# Patient Record
Sex: FEMALE | Race: BLACK OR AFRICAN AMERICAN | Employment: FULL TIME | URBAN - METROPOLITAN AREA
[De-identification: names, ages, dates, MRNs, and addresses within clinical notes are randomized per-mention and may not be internally consistent; named-entity substitution may affect disease eponyms.]

---

## 2019-09-22 ENCOUNTER — HOSPITAL ENCOUNTER (EMERGENCY)
Age: 34
Discharge: HOME | End: 2019-09-22
Attending: EMERGENCY MEDICINE
Payer: COMMERCIAL

## 2019-09-22 VITALS
OXYGEN SATURATION: 98 % | DIASTOLIC BLOOD PRESSURE: 60 MMHG | SYSTOLIC BLOOD PRESSURE: 112 MMHG | HEART RATE: 92 BPM | TEMPERATURE: 98.6 F | RESPIRATION RATE: 18 BRPM
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
## 2019-09-22 DIAGNOSIS — S16.1XXA NECK STRAIN, INITIAL ENCOUNTER: Primary | ICD-10-CM

## 2019-09-22 PROCEDURE — 74011250637 HC RX REV CODE- 250/637: Performed by: EMERGENCY MEDICINE

## 2019-09-22 PROCEDURE — 99283 EMERGENCY DEPT VISIT LOW MDM: CPT

## 2019-09-22 RX ORDER — NAPROXEN 250 MG/1
500 TABLET ORAL
Status: COMPLETED | OUTPATIENT
Start: 2019-09-22 | End: 2019-09-22

## 2019-09-22 RX ORDER — METHOCARBAMOL 750 MG/1
750 TABLET, FILM COATED ORAL
Qty: 20 TAB | Refills: 0 | Status: SHIPPED | OUTPATIENT
Start: 2019-09-22

## 2019-09-22 RX ORDER — NAPROXEN 500 MG/1
500 TABLET ORAL 2 TIMES DAILY WITH MEALS
Qty: 20 TAB | Refills: 0 | Status: SHIPPED | OUTPATIENT
Start: 2019-09-22 | End: 2019-10-02

## 2019-09-22 RX ADMIN — NAPROXEN 500 MG: 250 TABLET ORAL at 11:43
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
## 2019-09-22 NOTE — LETTER
[address] 
[facility] EMERGENCY DEPT 
[address] [facility] [place] [address] [phone] Work/School Note Date: 9/22/2019 To Whom It May concern: 
 
Sandra Scott was seen and treated today in the emergency room by the following provider(s): 
No providers found. Sandra Scott may return to work on 9/24. Sincerely, José Miguel Gupta, DO

## 2019-09-25 ENCOUNTER — OFFICE VISIT (OUTPATIENT)
Dept: FAMILY MEDICINE CLINIC | Age: 34
End: 2019-09-25

## 2019-09-25 VITALS
DIASTOLIC BLOOD PRESSURE: 67 MMHG | BODY MASS INDEX: 22.28 KG/M2 | HEIGHT: 61 IN | SYSTOLIC BLOOD PRESSURE: 99 MMHG | TEMPERATURE: 98.3 F | WEIGHT: 118 LBS | HEART RATE: 76 BPM | RESPIRATION RATE: 16 BRPM | OXYGEN SATURATION: 99 %
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
## 2019-09-25 DIAGNOSIS — S16.1XXD NECK STRAIN, SUBSEQUENT ENCOUNTER: ICD-10-CM
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
## 2019-09-25 NOTE — PATIENT INSTRUCTIONS
Continue to take naproxen and robaxin as needed for neck pain. Start applying heat to your neck and start neck stretches as well. Call office of physical therapist to schedule an appointment.
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
## 2019-09-27 NOTE — PROGRESS NOTES
[address] Medicine Residency Attending Addendum:  Patient encounter was discussed on the day of the encounter with Rodolfo Marion MD, performing the key elements of the service. I discussed the findings, assessment and plan with the resident and agree with the resident's findings and plan as documented in the resident's note.       Nickey Duverney, MD, CAQSM, RMSK

## 2019-10-25 ENCOUNTER — HOSPITAL ENCOUNTER (OUTPATIENT)
Dept: PHYSICAL THERAPY | Age: 34
Discharge: HOME | End: 2019-10-25
Attending: STUDENT IN AN ORGANIZED HEALTH CARE EDUCATION/TRAINING PROGRAM
Payer: COMMERCIAL

## 2019-10-25 PROCEDURE — 97161 PT EVAL LOW COMPLEX 20 MIN: CPT | Performed by: PHYSICAL THERAPIST

## 2019-10-25 PROCEDURE — 97110 THERAPEUTIC EXERCISES: CPT | Performed by: PHYSICAL THERAPIST
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
## 2019-11-01 ENCOUNTER — APPOINTMENT (OUTPATIENT)
Dept: PHYSICAL THERAPY | Age: 34
End: 2019-11-01
Attending: STUDENT IN AN ORGANIZED HEALTH CARE EDUCATION/TRAINING PROGRAM
Payer: COMMERCIAL

## 2019-11-08 ENCOUNTER — HOSPITAL ENCOUNTER (OUTPATIENT)
Dept: PHYSICAL THERAPY | Age: 34
Discharge: HOME | End: 2019-11-08
Attending: STUDENT IN AN ORGANIZED HEALTH CARE EDUCATION/TRAINING PROGRAM
Payer: COMMERCIAL

## 2019-11-08 PROCEDURE — 97110 THERAPEUTIC EXERCISES: CPT | Performed by: PHYSICAL THERAPIST

## 2019-11-08 NOTE — PROGRESS NOTES
PT DAILY TREATMENT NOTE - [facility] 2-15    Patient Name: Faisal Knight  Date:2019  : 1985  [x]  Patient  Verified  Payor: BLUE CROSS / Plan: ip.access [facility] / Product Type: PPO /    In time: 810 A  Out time: 900 A  Total Treatment Time (min): 50  Total Timed Codes (min): 40  1:1 Treatment Time ( only): 30   Visit #:  2    Treatment Area: Neck pain [M54.2]    SUBJECTIVE  Pain Level (0-10 scale): 6  Any medication changes, allergies to medications, adverse drug reactions, diagnosis change, or new procedure performed?: [x] No    [] Yes (see summary sheet for update)  Subjective functional status/changes:   [] No changes reported  She reports overall improvement in symptoms- \"today is the first day I've had a headache since I saw you last\". She reports \"some\" compliance with HEP.  She has not needed to use ice at home    OBJECTIVE    Modality rationale: decrease inflammation and decrease pain to improve the patients ability to perform daily chores with dec' dsymptoms   Min Type Additional Details       [] Estim: []Att   []Unatt    []TENS instruct                  []IFC  []Premod   []NMES                     []Other:  []w/US   []w/ice   []w/heat  Position:  Location:       []  Traction: [] Cervical       []Lumbar                       [] Prone          []Supine                       []Intermittent   []Continuous Lbs:  [] before manual  [] after manual  []w/heat    []  Ultrasound: []Continuous   [] Pulsed                       at: []1MHz   []3MHz Location:  W/cm2:    [] Paraffin         Location:   []w/heat   10 [x]  Ice     []  Heat  []  Ice massage Position: seated  Location: R shoulder, cervical    []  Laser  []  Other: Position:  Location:      []  Vasopneumatic Device Pressure:       [] lo [] med [] hi   Temperature:      [x] Skin assessment post-treatment:  [x]intact []redness- no adverse reaction    []redness  adverse reaction:     40 min Therapeutic Exercise:  [x] See flow sheet : progressed per flow sheet   Rationale: increase ROM and increase strength to improve the patients ability to perform daily chores, activities with dec'd symptoms            With   [] TE   [] TA   [] neuro   [] other: Patient Education: [x] Review HEP    [] Progressed/Changed HEP based on:   [] positioning   [] body mechanics   [] transfers   [] heat/ice application    [] other:      Other Objective/Functional Measures: n/a     Pain Level (0-10 scale) post treatment:  0    ASSESSMENT/Changes in Function:   Overall coreen therapy session well. Encouraged compliance with HEP in order to maximize benefit of PT    Patient will continue to benefit from skilled PT services to modify and progress therapeutic interventions, address functional mobility deficits, address ROM deficits, analyze and address soft tissue restrictions, analyze and cue movement patterns and analyze and modify body mechanics/ergonomics to attain remaining goals.      [x]  See Plan of Care  []  See progress note/recertification  []  See Discharge Summary         Progress towards goals / Updated goals:  nt    PLAN  []  Upgrade activities as tolerated     [x]  Continue plan of care  []  Update interventions per flow sheet       []  Discharge due to:_  []  Other:_      Viki Gama, PT 11/8/2019

## 2019-11-11 ENCOUNTER — HOSPITAL ENCOUNTER (OUTPATIENT)
Dept: LAB | Age: 34
Discharge: HOME | End: 2019-11-11

## 2019-11-15 ENCOUNTER — HOSPITAL ENCOUNTER (OUTPATIENT)
Dept: PHYSICAL THERAPY | Age: 34
Discharge: HOME | End: 2019-11-15
Attending: STUDENT IN AN ORGANIZED HEALTH CARE EDUCATION/TRAINING PROGRAM
Payer: COMMERCIAL

## 2019-11-15 PROCEDURE — 97110 THERAPEUTIC EXERCISES: CPT | Performed by: PHYSICAL THERAPIST

## 2019-11-15 NOTE — PROGRESS NOTES
PT DAILY TREATMENT NOTE - [facility] 2-15    Patient Name: Erma Mayes  Date:11/15/2019  : 1985  [x]  Patient  Verified  Payor: BLUE CROSS / Plan: [employer] [facility] / Product Type: PPO /    In time: 810 A  Out time: 855  A  Total Treatment Time (min): 45  Total Timed Codes (min): 45  1:1 Treatment Time ( only): 40   Visit #:  3    Treatment Area: Neck pain [M54.2]    SUBJECTIVE  Pain Level (0-10 scale): 2  Any medication changes, allergies to medications, adverse drug reactions, diagnosis change, or new procedure performed?: [x] No    [] Yes (see summary sheet for update)  Subjective functional status/changes:   [] No changes reported  She states that she had some soreness between her shoulder blades after last PT visit- symptoms lasted approx 24 hours. Reports good compliance with HEP.     OBJECTIVE    Modality rationale: decrease inflammation and decrease pain to improve the patients ability to perform daily chores with dec' dsymptoms   Min Type Additional Details       [] Estim: []Att   []Unatt    []TENS instruct                  []IFC  []Premod   []NMES                     []Other:  []w/US   []w/ice   []w/heat  Position:  Location:       []  Traction: [] Cervical       []Lumbar                       [] Prone          []Supine                       []Intermittent   []Continuous Lbs:  [] before manual  [] after manual  []w/heat    []  Ultrasound: []Continuous   [] Pulsed                       at: []1MHz   []3MHz Location:  W/cm2:    [] Paraffin         Location:   []w/heat   declined [x]  Ice     []  Heat  []  Ice massage Position: seated  Location: R shoulder, cervical    []  Laser  []  Other: Position:  Location:      []  Vasopneumatic Device Pressure:       [] lo [] med [] hi   Temperature:      [x] Skin assessment post-treatment:  [x]intact []redness- no adverse reaction    []redness  adverse reaction:     45 min Therapeutic Exercise:  [x] See flow sheet : progressed per flow sheet   Rationale: increase ROM and increase strength to improve the patients ability to perform daily chores, activities with dec'd symptoms            With   [] TE   [] TA   [] neuro   [] other: Patient Education: [x] Review HEP    [] Progressed/Changed HEP based on:   [] positioning   [] body mechanics   [] transfers   [] heat/ice application    [] other:      Other Objective/Functional Measures: n/a     Pain Level (0-10 scale) post treatment:  0    ASSESSMENT/Changes in Function:   Updated HEP handout. Beth therapy session well; dec'd symptoms after treatment session    Patient will continue to benefit from skilled PT services to modify and progress therapeutic interventions, address functional mobility deficits, address ROM deficits, analyze and address soft tissue restrictions, analyze and cue movement patterns and analyze and modify body mechanics/ergonomics to attain remaining goals.      [x]  See Plan of Care  []  See progress note/recertification  []  See Discharge Summary         Progress towards goals / Updated goals:  nt    PLAN  []  Upgrade activities as tolerated     [x]  Continue plan of care  []  Update interventions per flow sheet       []  Discharge due to:_  []  Other:_      Rayne Kilpatrick, PT 11/15/2019

## 2019-11-22 ENCOUNTER — HOSPITAL ENCOUNTER (OUTPATIENT)
Dept: PHYSICAL THERAPY | Age: 34
Discharge: HOME | End: 2019-11-22
Attending: STUDENT IN AN ORGANIZED HEALTH CARE EDUCATION/TRAINING PROGRAM
Payer: COMMERCIAL

## 2019-11-22 PROCEDURE — 97110 THERAPEUTIC EXERCISES: CPT | Performed by: PHYSICAL THERAPIST

## 2019-11-22 NOTE — ANCILLARY DISCHARGE INSTRUCTIONS
[facility] Physical Therapy [address] [place], [address] Phone: [phone]  Fax: [phone] Discharge Summary  2-15 Patient name: Lucy Agee  : 1985  Provider#:[id number] Referral source: Ekaterina Cerna MD     
Medical/Treatment Diagnosis: Neck pain [M54.2] Prior Hospitalization: see medical history Comorbidities: see eval 
Prior Level of Function:see eval 
Medications: Verified on Patient Summary List 
 
Start of Care: 10/25/19      Onset Date:see eval  
Visits from Start of Care: 4     Missed Visits: see CC Reporting Period : 10/25/19 to 19 Summary of care:  
Bilat AROM shoulder flex/abd WNL Gross UE strength 5/ 
  
FOTO: see chart          
  
Pain Level (0-10 scale) post treatment:  0 Progress towards goals / Updated goals: 
Short Term Goals: To be accomplished in 3-4 weeks: 
1) Pt will be independent in final HEP MET 2) Pt will report >/=50% improvement in symptoms MET 3) Pt will improve FOTO score to >/=74/100 in order to demonstrate improvement in functional mobility MET 
  
ASSESSMENT/Changes in Function:  
Pt has completed 4 skilled PT visits. She reports 80% improvement in symptoms and has met 3/3 PT goals. Bilat UE ROM and strength WNL. Good compliance with HEP. Pt ready for D/C to HEP. RECOMMENDATIONS: 
[x]Discontinue therapy: [x]Patient has reached or is progressing toward set goals []Patient is non-compliant or has abdicated 
    []Due to lack of appreciable progress towards set goals Onnie Morning, PT 2019 10:45 AM

## 2019-11-22 NOTE — PROGRESS NOTES
PT DAILY TREATMENT/Discharge NOTE - [facility] 2-15    Patient Name: Yudith Brown  Date:2019  : 1985  [x]  Patient  Verified  Payor: BLUE CROSS / Plan: [facility] / Product Type: PPO /    In time: 815 A  Out time: 900  A  Total Treatment Time (min): 45  Total Timed Codes (min): 45  1:1 Treatment Time ( only): 30   Visit #:  4    Treatment Area: Neck pain [M54.2]    SUBJECTIVE  Pain Level (0-10 scale): 0 \"some stiffness\"  Any medication changes, allergies to medications, adverse drug reactions, diagnosis change, or new procedure performed?: [x] No    [] Yes (see summary sheet for update)  Subjective functional status/changes:   [] No changes reported  Pt reports 90% improvement in symptoms.  Good compliance with HEP    OBJECTIVE    Modality rationale: decrease inflammation and decrease pain to improve the patients ability to perform daily chores with dec' dsymptoms   Min Type Additional Details       [] Estim: []Att   []Unatt    []TENS instruct                  []IFC  []Premod   []NMES                     []Other:  []w/US   []w/ice   []w/heat  Position:  Location:       []  Traction: [] Cervical       []Lumbar                       [] Prone          []Supine                       []Intermittent   []Continuous Lbs:  [] before manual  [] after manual  []w/heat    []  Ultrasound: []Continuous   [] Pulsed                       at: []1MHz   []3MHz Location:  W/cm2:    [] Paraffin         Location:   []w/heat   declined [x]  Ice     []  Heat  []  Ice massage Position: seated  Location: R shoulder, cervical    []  Laser  []  Other: Position:  Location:      []  Vasopneumatic Device Pressure:       [] lo [] med [] hi   Temperature:      [x] Skin assessment post-treatment:  [x]intact []redness- no adverse reaction    []redness  adverse reaction:     45 min Therapeutic Exercise:  [x] See flow sheet : reviewed HEP; measurements taken for D/C   Rationale: increase ROM and increase strength to improve the patients ability to perform daily chores, activities with dec'd symptoms            With   [] TE   [] TA   [] neuro   [] other: Patient Education: [x] Review HEP    [] Progressed/Changed HEP based on:   [] positioning   [] body mechanics   [] transfers   [] heat/ice application    [] other:      Other Objective/Functional Measures:   Bilat AROM shoulder flex/abd WNL  Gross UE strength 5/5    FOTO: see chart     Pain Level (0-10 scale) post treatment:  0    ASSESSMENT/Changes in Function:   Pt has completed 4 skilled PT visits. She reports 80% improvement in symptoms and has met 3/3 PT goals. Bilat UE ROM and strength WNL. Good compliance with HEP. Pt ready for D/C to HEP. []  See Plan of Care  []  See progress note/recertification  [x]  See Discharge Summary         Progress towards goals / Updated goals:  Short Term Goals: To be accomplished in 3-4 weeks:  1) Pt will be independent in final HEP MET  2) Pt will report >/=50% improvement in symptoms MET  3) Pt will improve FOTO score to >/=74/100 in order to demonstrate improvement in functional mobility MET    PLAN  D/C to HEP: pt met 100% PT goals.     Cynthia Maier, PT 11/22/2019

## 2019-11-25 ENCOUNTER — OFFICE VISIT (OUTPATIENT)
Dept: FAMILY MEDICINE CLINIC | Age: 34
End: 2019-11-25

## 2019-11-25 VITALS
HEIGHT: 61 IN | DIASTOLIC BLOOD PRESSURE: 57 MMHG | SYSTOLIC BLOOD PRESSURE: 92 MMHG | BODY MASS INDEX: 23.79 KG/M2 | HEART RATE: 82 BPM | WEIGHT: 126 LBS

## 2019-11-25 DIAGNOSIS — S16.1XXD NECK STRAIN, SUBSEQUENT ENCOUNTER: Primary | ICD-10-CM
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
## 2020-12-06 ENCOUNTER — APPOINTMENT (OUTPATIENT)
Dept: CT IMAGING | Age: 35
End: 2020-12-06
Attending: EMERGENCY MEDICINE
Payer: COMMERCIAL

## 2020-12-06 ENCOUNTER — HOSPITAL ENCOUNTER (EMERGENCY)
Age: 35
Discharge: HOME | End: 2020-12-06
Attending: EMERGENCY MEDICINE
Payer: COMMERCIAL

## 2020-12-06 VITALS
HEIGHT: 61 IN | HEART RATE: 85 BPM | SYSTOLIC BLOOD PRESSURE: 121 MMHG | DIASTOLIC BLOOD PRESSURE: 61 MMHG | BODY MASS INDEX: 25.86 KG/M2 | TEMPERATURE: 98.4 F | OXYGEN SATURATION: 99 % | WEIGHT: 137 LBS | RESPIRATION RATE: 15 BRPM

## 2020-12-06 DIAGNOSIS — G50.0 TRIGEMINAL NEURALGIA OF RIGHT SIDE OF FACE: Primary | ICD-10-CM

## 2020-12-06 PROCEDURE — 70450 CT HEAD/BRAIN W/O DYE: CPT

## 2020-12-06 PROCEDURE — 74011250637 HC RX REV CODE- 250/637: Performed by: EMERGENCY MEDICINE

## 2020-12-06 PROCEDURE — 99283 EMERGENCY DEPT VISIT LOW MDM: CPT

## 2020-12-06 RX ORDER — CARBAMAZEPINE 200 MG/1
200 TABLET ORAL 2 TIMES DAILY
Qty: 60 TAB | Refills: 0 | Status: SHIPPED | OUTPATIENT
Start: 2020-12-06 | End: 2021-01-05

## 2020-12-06 RX ORDER — CARBAMAZEPINE 200 MG/1
200 TABLET ORAL
Status: COMPLETED | OUTPATIENT
Start: 2020-12-06 | End: 2020-12-06

## 2020-12-06 RX ORDER — CARBAMAZEPINE 200 MG/1
200 TABLET ORAL 3 TIMES DAILY
Qty: 90 TAB | Refills: 0 | Status: SHIPPED | OUTPATIENT
Start: 2020-12-06 | End: 2020-12-06

## 2020-12-06 RX ADMIN — CARBAMAZEPINE 200 MG: 200 TABLET ORAL at 20:44

## 2020-12-07 NOTE — ED TRIAGE NOTES
Pt here for sharp pains that start at temple and travels down behind ear down to neck. States that this occurred over a year ago and has not been an issue again until last night. Episodes last only a few seconds. States only other symptoms finger twitching with pains. Pain is primarily right sided.

## 2020-12-08 NOTE — ED PROVIDER NOTES
56-year-old female with no significant past medical history presents to the emergency department noting intermittent very sharp stabbing pains around her right face and temple area traveling down her face and around her ear intermittently that started last night. She states that her pains come on very severe and sharp and last for only a few seconds and then spontaneously resolve. She notes a history of similar symptoms approximately a year ago that spontaneously resolved. She denies any trauma to the area, vision changes, difficulty speaking, numbness, weakness. Pain is only on the right side of her face. She denies any known history of trigeminal neuralgia, does not follow with neurology. She has not taken anything for her symptoms because her symptoms seem to spontaneously resolve on their own after only few seconds. Headache    Pertinent negatives include no fever, no shortness of breath, no weakness, no nausea and no vomiting.         Past Medical History:   Diagnosis Date    Abnormal Pap smear     rechecked-ok       Past Surgical History:   Procedure Laterality Date     DELIVERY ONLY      11    HX WISDOM TEETH EXTRACTION           Family History:   Problem Relation Age of Onset   [address] [facility] Migraines Mother     Hypertension Mother     Alcohol abuse Father     Elevated Lipids Father     Cancer Maternal Aunt        Social History     Socioeconomic History    Marital status: SINGLE     Spouse name: Not on file    Number of children: Not on file    Years of education: Not on file    Highest education level: Not on file   Occupational History    Not on file   Social Needs    Financial resource strain: Not on file    Food insecurity     Worry: Not on file     Inability: Not on file    Transportation needs     Medical: Not on file     Non-medical: Not on file   Tobacco Use    Smoking status: Never Smoker    Smokeless tobacco: Never Used   Substance and Sexual Activity    Alcohol use: No    Drug use: No    Sexual activity: Yes     Partners: Male     Birth control/protection: None   Lifestyle    Physical activity     Days per week: Not on file     Minutes per session: Not on file    Stress: Not on file   Relationships    Social connections     Talks on phone: Not on file     Gets together: Not on file     Attends Adventist service: Not on file     Active member of club or organization: Not on file     Attends meetings of clubs or organizations: Not on file     Relationship status: Not on file    Intimate partner violence     Fear of current or ex partner: Not on file     Emotionally abused: Not on file     Physically abused: Not on file     Forced sexual activity: Not on file   Other Topics Concern    Not on file   Social History Narrative    Not on file         ALLERGIES: Patient has no known allergies. Review of Systems   Constitutional: Negative for activity change, appetite change, chills and fever. HENT: Negative for congestion, facial swelling, hearing loss, rhinorrhea, sinus pressure, sneezing and sore throat. Eyes: Negative for photophobia and visual disturbance. Respiratory: Negative for cough and shortness of breath. Cardiovascular: Negative for chest pain. Gastrointestinal: Negative for abdominal pain, blood in stool, constipation, diarrhea, nausea and vomiting. Genitourinary: Negative for difficulty urinating, dysuria, flank pain, frequency, hematuria, menstrual problem, urgency, vaginal bleeding and vaginal discharge. Musculoskeletal: Negative for arthralgias, back pain, myalgias and neck pain. Skin: Negative for rash and wound. Neurological: Positive for headaches. Negative for syncope, weakness and numbness. Psychiatric/Behavioral: Negative for self-injury and suicidal ideas. All other systems reviewed and are negative.       Vitals:    12/06/20 1903   BP: 121/61   Pulse: 85   Resp: 15   Temp: 98.4 °F (36.9 °C)   SpO2: 99%   Weight: 62.1 kg (137 lb) Height: 5' 1\" (1.549 m)            Physical Exam  Vitals signs and nursing note reviewed. Constitutional:       General: She is not in acute distress. Appearance: Normal appearance. She is well-developed. She is not diaphoretic. HENT:      Head: Normocephalic and atraumatic. No raccoon eyes, Montemayor's sign, contusion or laceration. Nose: Nose normal.   Eyes:      Extraocular Movements: Extraocular movements intact. Conjunctiva/sclera: Conjunctivae normal.      Pupils: Pupils are equal, round, and reactive to light. Neck:      Musculoskeletal: Neck supple. Cardiovascular:      Rate and Rhythm: Normal rate and regular rhythm. Heart sounds: Normal heart sounds. Pulmonary:      Effort: Pulmonary effort is normal.      Breath sounds: Normal breath sounds. Abdominal:      General: There is no distension. Palpations: Abdomen is soft. Tenderness: There is no abdominal tenderness. Musculoskeletal:         General: No tenderness. Skin:     General: Skin is warm and dry. Neurological:      General: No focal deficit present. Mental Status: She is alert and oriented to person, place, and time. Cranial Nerves: No cranial nerve deficit. Sensory: No sensory deficit. Motor: No weakness. Coordination: Coordination normal.          MDM   25-year-old female presents with right facial pain intermittently, exquisitely reproducible with palpation over the right cheek/trigeminal area. Exam and history highly suggestive of trigeminal neuralgia. CT head shows no acute intracranial abnormalities. She is given a dose of carbamazepine in the ED and Rx course of the same. Recommended close neurology follow-up for further evaluation and return precautions were given for worsening or concerns.     Procedures

## 2020-12-09 ENCOUNTER — OFFICE VISIT (OUTPATIENT)
Dept: NEUROLOGY | Age: 35
End: 2020-12-09
Payer: COMMERCIAL

## 2020-12-09 VITALS
WEIGHT: 136 LBS | SYSTOLIC BLOOD PRESSURE: 104 MMHG | BODY MASS INDEX: 25.68 KG/M2 | RESPIRATION RATE: 16 BRPM | TEMPERATURE: 97.2 F | DIASTOLIC BLOOD PRESSURE: 68 MMHG | OXYGEN SATURATION: 98 % | HEIGHT: 61 IN | HEART RATE: 91 BPM

## 2020-12-09 DIAGNOSIS — R29.818 TRANSIENT NEUROLOGICAL SYMPTOMS: Primary | ICD-10-CM

## 2020-12-09 PROCEDURE — 99204 OFFICE O/P NEW MOD 45 MIN: CPT | Performed by: SPECIALIST

## 2020-12-09 NOTE — PATIENT INSTRUCTIONS
Patient history reviewed patient examined. Make the following recommendations in terms of stopping the medicine issue the ER as it sounds like it is causing problems at this time. Do not throw it away but just stop taking it for now. Will order an EEG and I am glad that the CT was done the ER on that particular occasion. Exam looks normal this time and it is an interesting story line but not atypical trigeminal neuralgia sequence. Suggest revisit in 4 weeks.

## 2020-12-09 NOTE — PROGRESS NOTES
Chief Complaint   Patient presents with    Other     having pains in her heads started a year ago and they stopped.  the pains came back this past week and she went to the ER      Visit Vitals  /68 (BP 1 Location: Left arm, BP Patient Position: Sitting)   Pulse 91   Temp 97.2 °F (36.2 °C)   Resp 16   Ht 5' 1\" (1.549 m)   Wt 61.7 kg (136 lb)   SpO2 98%   BMI 25.70 kg/m²

## 2020-12-09 NOTE — PROGRESS NOTES
Neurology Consult      Subjective:      Gildardo Acharya is a 38 y.o. female who comes in today with the following history. Kim Tomlinson about a year ago she had experienced months worth of self-limited come and go right forehead to right jaw discomfort. Would come and go lasting seconds and then disappear. It went away and then returned on the weekend past.  The same right forehead pain to the right jaw area and went to the [place] [id number] ER on that occasion. With that story line the exam seem to check out okay and was perceived as perhaps a form of trigeminal neuralgia and treated with carbamazepine 200 mg as rescue. Head CT on that particular encounter was reported normal.  Denies any history of shingles in that pain distribution. Interestingly while in the ER the patient says the examination included the physicians inspection of the facial areas with pain and while this was going on the patient felt uncomfortable and found that her right arm and leg were shaking concomitantly although I am not sure they were in sync and she found she could not talk? This whole situation lasted for maybe 30 seconds and never lost consciousness. Was administered carbamazepine for what was felt to be trigeminal neuralgia. However on this occasion the pain is gone but the carbamazepine makes her feel dizzy and she talks slower and feels sleepy. No background history of seizures. Current Outpatient Medications   Medication Sig Dispense Refill    carBAMazepine (TEGretol) 200 mg tablet Take 1 Tab by mouth two (2) times a day for 30 days. 60 Tab 0    metroNIDAZOLE (METROGEL) 0.75 % vaginal gel       methocarbamol (ROBAXIN-750) 750 mg tablet Take 1 Tab by mouth three (3) times daily as needed for Pain (muscle spasm). 20 Tab 0    oxyCODONE-acetaminophen (PERCOCET) 5-325 mg per tablet Take 1-2 Tabs by mouth every six (6) hours as needed for Pain. Max Daily Amount: 8 Tabs.  24 Tab 0    oxyCODONE-acetaminophen (PERCOCET) 5-325 mg per tablet Take 1-2 Tabs by mouth every six (6) hours as needed for Pain. Max Daily Amount: 8 Tabs. 20 Tab 0    ondansetron (ZOFRAN ODT) 4 mg disintegrating tablet Take 1 Tab by mouth every eight (8) hours as needed for Nausea.  20 Tab 0      No Known Allergies  Past Medical History:   Diagnosis Date    Abnormal Pap smear     [id number]      Past Surgical History:   Procedure Laterality Date     DELIVERY ONLY      11    HX WISDOM TEETH EXTRACTION        Social History     Socioeconomic History    Marital status: SINGLE     Spouse name: Not on file    Number of children: Not on file    Years of education: Not on file    Highest education level: Not on file   Occupational History    Not on file   Social Needs    Financial resource strain: Not on file    Food insecurity     Worry: Not on file     Inability: Not on file    Transportation needs     Medical: Not on file     Non-medical: Not on file   Tobacco Use    Smoking status: Never Smoker    Smokeless tobacco: Never Used   Substance and Sexual Activity    Alcohol use: No    Drug use: No    Sexual activity: Yes     Partners: Male     Birth control/protection: None   Lifestyle    Physical activity     Days per week: Not on file     Minutes per session: Not on file    Stress: Not on file   Relationships    Social connections     Talks on phone: Not on file     Gets together: Not on file     Attends Yarsanism service: Not on file     Active member of club or organization: Not on file     Attends meetings of clubs or organizations: Not on file     Relationship status: Not on file    Intimate partner violence     Fear of current or ex partner: Not on file     Emotionally abused: Not on file     Physically abused: Not on file     Forced sexual activity: Not on file   Other Topics Concern    Not on file   Social History Narrative    Not on file      Family History   Problem Relation Age of Onset    Migraines Mother    Mutius[employer]Kind Hypertension Mother     Alcohol abuse Father     Elevated Lipids Father     Cancer Maternal Aunt       Visit Vitals  /68 (BP 1 Location: Left arm, BP Patient Position: Sitting)   Pulse 91   Temp 97.2 °F (36.2 °C)   Resp 16   Ht 5' 1\" (1.549 m)   Wt 61.7 kg (136 lb)   SpO2 98%   BMI 25.70 kg/m²        Review of Systems:   A comprehensive review of systems was negative except for that written in the HPI. Neuro Exam:     Appearance: The patient is well developed, well nourished, provides a coherent history and is in no acute distress. Mental Status: Oriented to time, place and person. Mood and affect appropriate. Cranial Nerves:   Intact visual fields. Fundi are benign. MAXI, EOM's full, no nystagmus, no ptosis. Facial sensation is normal and no reproduction of pain at any site. Corneal reflexes are intact. Facial movement is symmetric and no synkinesis. [place] Hearing is normal bilaterally. Palate is midline with normal sternocleidomastoid and trapezius muscles are normal. Tongue is midline. Motor:  5/5 strength in upper and lower proximal and distal muscles. Normal bulk and tone. No fasciculations. Reflexes:   Deep tendon reflexes 2+/4 and symmetrical.   Sensory:   Normal to touch, pinprick and vibration. Double spontaneous stimulation intact. Gait:  Normal gait. Tremor:   No tremor noted. Cerebellar:  No cerebellar signs present. Neurovascular:  Normal heart sounds and regular rhythm, peripheral pulses intact, and no carotid bruits. Assessment:   Transient neurologic symptoms as elaborated. A very strange story with isolated facial pain but a concomitant movement disorder speech arrest etc.  We will get an EEG to complement head CT already done and suggest revisit in about 4 weeks. Sounds like the carbamazepine is causing symptoms and currently does not have the face pain. My vote is to stop it for now. Would not throw it away however at this time.       Plan:   Revisit 4 weeks.  Signed by :  Dangelo Dunham MD

## 2020-12-16 ENCOUNTER — OFFICE VISIT (OUTPATIENT)
Dept: NEUROLOGY | Age: 35
End: 2020-12-16

## 2020-12-16 DIAGNOSIS — R29.818 TRANSIENT NEUROLOGICAL SYMPTOMS: Primary | ICD-10-CM

## 2020-12-16 NOTE — PROGRESS NOTES
This was an elective EEG performed for evaluation of transient neurologic symptoms. Routine scalp leads were applied according to the 1020 International system. EKG monitor as well. The background rhythm during the more awake portions was 8 to 12 Hz. Reasonable modulation with eye opening and eye closure. No evidence of focal slowing or spontaneous electrocerebral discharges. EKG grossly sinus rhythm. Hyperventilation not performed. Photic stimulation produced occasional truncated photic drive at different harmonics. No technician note as to any untoward patient movement mannerisms behavior or vocalizations. As the record proceeded there was transient drowsiness as accompanied by uniform dampening of background activity. This was later replaced by light sleep characteristics of vertex waves sleep spindles K complexes etc.    Impression: This is a normal awake and drowsy sleep recorded EEG as stipulated. Clinical correlation is advised.   MARTY ETIENNE.

## 2023-05-12 RX ORDER — METHOCARBAMOL 750 MG/1
TABLET, FILM COATED ORAL 3 TIMES DAILY PRN
COMMUNITY
Start: 2019-09-22

## 2023-05-12 RX ORDER — ONDANSETRON 4 MG/1
TABLET, ORALLY DISINTEGRATING ORAL EVERY 8 HOURS PRN
COMMUNITY
Start: 2015-09-14

## 2023-05-12 RX ORDER — METRONIDAZOLE 7.5 MG/G
GEL VAGINAL
COMMUNITY
Start: 2015-09-16

## 2023-05-12 RX ORDER — OXYCODONE HYDROCHLORIDE AND ACETAMINOPHEN 5; 325 MG/1; MG/1
1-2 TABLET ORAL EVERY 6 HOURS PRN
COMMUNITY
Start: 2015-09-14